# Patient Record
(demographics unavailable — no encounter records)

---

## 2024-12-04 NOTE — DATA REVIEWED
[Outside X-rays] : outside x-rays [Knee] : knee [Report was reviewed and noted in the chart] : The report was reviewed and noted in the chart [I independently reviewed and interpreted images and report] : I independently reviewed and interpreted images and report [I reviewed the films/CD and agree] : I reviewed the films/CD and agree

## 2024-12-04 NOTE — ASSESSMENT
[FreeTextEntry1] : After discussing various treatment options with the patient including but not limited to oral medications, physical therapy, exercise, modalities as well as interventional spinal injections, we have decided with the following plan:.  1) will refill Lyrica  Patient is stable on current regimen of medication. Denies any side of effects. Risks and benefits discussed. Functional improvement noted. At least >30% improvement of pain. Will renew patient medication.    I, Luba Hong, acting as scribe, attest that this documentation has been prepared under the direction and in the presence of Provider Mary العراقي MD.  The documentation recorded by the scribe, in my presence, accurately reflects the service I personally performed, and the decisions made by me with my edits as appropriate.

## 2024-12-04 NOTE — HISTORY OF PRESENT ILLNESS
[Lower back] : lower back [10] : 10 [Sharp] : sharp [Tingling] : tingling [Intermittent] : intermittent [Sleep] : sleep [Meds] : meds [Sitting] : sitting [Standing] : standing [Walking] : walking [Lying in bed] : lying in bed [] : no [FreeTextEntry1] : RT KNEE [FreeTextEntry7] : rt leg [FreeTextEntry9] : tramadol, lyrica, tizanidine

## 2025-04-10 NOTE — HISTORY OF PRESENT ILLNESS
[Lower back] : lower back [10] : 10 [Sharp] : sharp [Tingling] : tingling [Intermittent] : intermittent [Sleep] : sleep [Meds] : meds [Sitting] : sitting [Standing] : standing [Walking] : walking [Lying in bed] : lying in bed [Household chores] : household chores [Social interactions] : social interactions [] : no [FreeTextEntry1] : RT KNEE [FreeTextEntry7] : rt leg [FreeTextEntry9] : tramadol, lyrica, tizanidine

## 2025-04-10 NOTE — ASSESSMENT
[FreeTextEntry1] : After discussing various treatment options with the patient including but not limited to oral medications, physical therapy, exercise, modalities as well as interventional spinal injections, we have decided with the following plan:.  1) will refill Lyrica  Patient is stable on current regimen of medication. Denies any side of effects. Risks and benefits discussed. Functional improvement noted. At least >30% improvement of pain. Will renew patient medication.    I, Luba Hong, acting as scribe, attest that this documentation has been prepared under the direction and in the presence of Provider Shannan Domingo PA-C.  The documentation recorded by the scribe, in my presence, accurately reflects the service I personally performed, and the decisions made by me with my edits as appropriate.

## 2025-04-10 NOTE — PHYSICAL EXAM
[Right] : right knee [5___] : hamstring 5[unfilled]/5 [NL (140)] : flexion 140 degrees [NL (0)] : extension 0 degrees [] : no deformities of quad tendon

## 2025-07-23 NOTE — HISTORY OF PRESENT ILLNESS
[Lower back] : lower back [10] : 10 [Sharp] : sharp [Tingling] : tingling [Intermittent] : intermittent [Household chores] : household chores [Sleep] : sleep [Social interactions] : social interactions [Meds] : meds [Sitting] : sitting [Standing] : standing [Walking] : walking [Lying in bed] : lying in bed [] : yes [FreeTextEntry1] : RT KNEE [FreeTextEntry7] : rt leg [FreeTextEntry9] : tramadol, lyrica, tizanidine

## 2025-07-23 NOTE — PHYSICAL EXAM
[Right] : right knee [] : crepitus about the patella [NL (140)] : flexion 140 degrees [NL (0)] : extension 0 degrees [5___] : hamstring 5[unfilled]/5